# Patient Record
Sex: MALE | Race: BLACK OR AFRICAN AMERICAN | NOT HISPANIC OR LATINO | URBAN - NONMETROPOLITAN AREA
[De-identification: names, ages, dates, MRNs, and addresses within clinical notes are randomized per-mention and may not be internally consistent; named-entity substitution may affect disease eponyms.]

---

## 2019-07-17 ENCOUNTER — IMPORTED ENCOUNTER (OUTPATIENT)
Dept: URBAN - NONMETROPOLITAN AREA CLINIC 1 | Facility: CLINIC | Age: 59
End: 2019-07-17

## 2019-07-17 PROCEDURE — 92015 DETERMINE REFRACTIVE STATE: CPT

## 2019-07-17 PROCEDURE — 92014 COMPRE OPH EXAM EST PT 1/>: CPT

## 2019-07-17 NOTE — PATIENT DISCUSSION
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 Is presently on 20mg prednisone and beeing followed by Dr Mildred Cabrera and Dr Romana Duke. Letter. BF rx given - no change. RTC prn and x one year.; 's Notes: Casey Hyde and Christelle Kaminski. Had neck sx.   In neck cast.

## 2020-07-14 ENCOUNTER — IMPORTED ENCOUNTER (OUTPATIENT)
Dept: URBAN - NONMETROPOLITAN AREA CLINIC 1 | Facility: CLINIC | Age: 60
End: 2020-07-14

## 2020-07-14 PROBLEM — D86.0: Noted: 2020-07-14

## 2020-07-14 PROCEDURE — 92015 DETERMINE REFRACTIVE STATE: CPT

## 2020-07-14 PROCEDURE — 92014 COMPRE OPH EXAM EST PT 1/>: CPT

## 2020-07-14 NOTE — PATIENT DISCUSSION
Myopia-Discussed diagnosis with patient. -Explained that people who are myopic are at a higher risk for developing RD/RT and reviewed associated S&S.-Pt to contact our office if symptoms develop. Astigmatism-Discussed diagnosis with patient. Presbyopia-Discussed diagnosis with patient.sarcoid by natalee today; 's Notes: Delores Reason and [employer]. Had neck sx.   In neck cast.

## 2022-03-30 ENCOUNTER — EMERGENCY VISIT (OUTPATIENT)
Dept: RURAL CLINIC 1 | Facility: CLINIC | Age: 62
End: 2022-03-30

## 2022-03-30 DIAGNOSIS — H04.123: ICD-10-CM

## 2022-03-30 PROCEDURE — 99213 OFFICE O/P EST LOW 20 MIN: CPT

## 2022-03-30 ASSESSMENT — TONOMETRY
OD_IOP_MMHG: 16
OS_IOP_MMHG: 16

## 2022-03-30 ASSESSMENT — VISUAL ACUITY
OS_CC: 20/20
OD_CC: 20/20

## 2022-04-09 ASSESSMENT — VISUAL ACUITY
OU_CC: J1+
OS_SC: 20/30
OD_SC: 20/20
OS_SC: 20/20
OD_SC: 20/20

## 2022-04-09 ASSESSMENT — TONOMETRY
OS_IOP_MMHG: 19
OD_IOP_MMHG: 20
OD_IOP_MMHG: 20
OS_IOP_MMHG: 20

## 2023-11-29 ENCOUNTER — ESTABLISHED PATIENT (OUTPATIENT)
Dept: RURAL CLINIC 2 | Facility: CLINIC | Age: 63
End: 2023-11-29

## 2023-11-29 DIAGNOSIS — H52.13: ICD-10-CM

## 2023-11-29 DIAGNOSIS — H43.813: ICD-10-CM

## 2023-11-29 DIAGNOSIS — H25.813: ICD-10-CM

## 2023-11-29 DIAGNOSIS — H16.223: ICD-10-CM

## 2023-11-29 DIAGNOSIS — M12.9: ICD-10-CM

## 2023-11-29 DIAGNOSIS — Z79.899: ICD-10-CM

## 2023-11-29 DIAGNOSIS — H52.4: ICD-10-CM

## 2023-11-29 DIAGNOSIS — H52.223: ICD-10-CM

## 2023-11-29 PROCEDURE — 99214 OFFICE O/P EST MOD 30 MIN: CPT

## 2023-11-29 PROCEDURE — 92015 DETERMINE REFRACTIVE STATE: CPT

## 2023-11-29 PROCEDURE — 92134 CPTRZ OPH DX IMG PST SGM RTA: CPT

## 2023-11-29 RX ORDER — CYCLOSPORINE 0.5 MG/ML: 1 EMULSION OPHTHALMIC TWICE A DAY

## 2023-11-29 ASSESSMENT — VISUAL ACUITY
OS_CC: 20/30
OD_CC: 20/20

## 2023-11-29 ASSESSMENT — TONOMETRY
OS_IOP_MMHG: 16
OD_IOP_MMHG: 17

## 2024-05-29 ENCOUNTER — ESTABLISHED PATIENT (OUTPATIENT)
Dept: RURAL CLINIC 2 | Facility: CLINIC | Age: 64
End: 2024-05-29

## 2024-05-29 DIAGNOSIS — Z79.899: ICD-10-CM

## 2024-05-29 DIAGNOSIS — M12.9: ICD-10-CM

## 2024-05-29 DIAGNOSIS — H43.813: ICD-10-CM

## 2024-05-29 DIAGNOSIS — H25.813: ICD-10-CM

## 2024-05-29 DIAGNOSIS — H16.223: ICD-10-CM

## 2024-05-29 PROCEDURE — 92083 EXTENDED VISUAL FIELD XM: CPT

## 2024-05-29 PROCEDURE — 99213 OFFICE O/P EST LOW 20 MIN: CPT

## 2024-05-29 ASSESSMENT — TONOMETRY
OD_IOP_MMHG: 17
OS_IOP_MMHG: 17

## 2024-05-29 ASSESSMENT — VISUAL ACUITY
OS_CC: 20/20-2
OD_CC: 20/20

## 2024-10-22 ENCOUNTER — EMERGENCY VISIT (OUTPATIENT)
Dept: RURAL CLINIC 2 | Facility: CLINIC | Age: 64
End: 2024-10-22

## 2024-10-22 DIAGNOSIS — H25.813: ICD-10-CM

## 2024-10-22 DIAGNOSIS — H16.223: ICD-10-CM

## 2024-10-22 DIAGNOSIS — M12.9: ICD-10-CM

## 2024-10-22 DIAGNOSIS — Z79.899: ICD-10-CM

## 2024-10-22 DIAGNOSIS — H43.813: ICD-10-CM

## 2024-10-22 DIAGNOSIS — H04.011: ICD-10-CM

## 2024-10-22 PROCEDURE — 99213 OFFICE O/P EST LOW 20 MIN: CPT

## 2024-10-22 RX ORDER — AMOXICILLIN AND CLAVULANATE POTASSIUM 500; 125 MG/1; 1/1: 1 TABLET, FILM COATED ORAL TWICE A DAY

## 2024-11-18 ENCOUNTER — EMERGENCY VISIT (OUTPATIENT)
Dept: RURAL CLINIC 2 | Facility: CLINIC | Age: 64
End: 2024-11-18

## 2024-11-18 DIAGNOSIS — M12.9: ICD-10-CM

## 2024-11-18 DIAGNOSIS — H43.813: ICD-10-CM

## 2024-11-18 DIAGNOSIS — H40.053: ICD-10-CM

## 2024-11-18 DIAGNOSIS — H16.223: ICD-10-CM

## 2024-11-18 DIAGNOSIS — Z79.899: ICD-10-CM

## 2024-11-18 DIAGNOSIS — H25.813: ICD-10-CM

## 2024-11-18 PROCEDURE — 99213 OFFICE O/P EST LOW 20 MIN: CPT

## 2024-11-18 RX ORDER — TIMOLOL MALEATE 5 MG/ML: 1 SOLUTION OPHTHALMIC EVERY MORNING

## 2024-12-18 ENCOUNTER — COMPREHENSIVE EXAM (OUTPATIENT)
Age: 64
End: 2024-12-18

## 2024-12-18 DIAGNOSIS — H16.223: ICD-10-CM

## 2024-12-18 DIAGNOSIS — H11.441: ICD-10-CM

## 2024-12-18 DIAGNOSIS — Z79.899: ICD-10-CM

## 2024-12-18 DIAGNOSIS — M12.9: ICD-10-CM

## 2024-12-18 DIAGNOSIS — H25.813: ICD-10-CM

## 2024-12-18 DIAGNOSIS — H43.813: ICD-10-CM

## 2024-12-18 DIAGNOSIS — H40.053: ICD-10-CM

## 2024-12-18 PROCEDURE — 92133 CPTRZD OPH DX IMG PST SGM ON: CPT

## 2024-12-18 PROCEDURE — 99213 OFFICE O/P EST LOW 20 MIN: CPT
